# Patient Record
Sex: MALE | Race: BLACK OR AFRICAN AMERICAN | Employment: OTHER
[De-identification: names, ages, dates, MRNs, and addresses within clinical notes are randomized per-mention and may not be internally consistent; named-entity substitution may affect disease eponyms.]

---

## 2023-02-28 ENCOUNTER — HOSPITAL ENCOUNTER (EMERGENCY)
Facility: HOSPITAL | Age: 78
Discharge: HOME | End: 2023-02-28
Attending: EMERGENCY MEDICINE
Payer: MEDICARE

## 2023-02-28 VITALS
HEART RATE: 100 BPM | DIASTOLIC BLOOD PRESSURE: 93 MMHG | SYSTOLIC BLOOD PRESSURE: 147 MMHG | RESPIRATION RATE: 14 BRPM | OXYGEN SATURATION: 98 % | HEIGHT: 70 IN | WEIGHT: 183 LBS | TEMPERATURE: 98.4 F | BODY MASS INDEX: 26.2 KG/M2

## 2023-02-28 DIAGNOSIS — R41.0 CONFUSION: ICD-10-CM

## 2023-02-28 DIAGNOSIS — G20 PARKINSON DISEASE (HCC): Primary | ICD-10-CM

## 2023-02-28 LAB
ALBUMIN SERPL-MCNC: 4.8 G/DL (ref 3.4–5)
ALBUMIN/GLOB SERPL: 1.5 (ref 0.8–1.7)
ALP SERPL-CCNC: 87 U/L (ref 45–117)
ALT SERPL-CCNC: 27 U/L (ref 16–61)
ANION GAP SERPL CALC-SCNC: 4 MMOL/L (ref 3–18)
AST SERPL-CCNC: 17 U/L (ref 10–38)
BASOPHILS # BLD: 0 K/UL (ref 0–0.1)
BASOPHILS NFR BLD: 0 % (ref 0–2)
BILIRUB SERPL-MCNC: 1.8 MG/DL (ref 0.2–1)
BUN SERPL-MCNC: 31 MG/DL (ref 7–18)
BUN/CREAT SERPL: 18 (ref 12–20)
CALCIUM SERPL-MCNC: 9.8 MG/DL (ref 8.5–10.1)
CHLORIDE SERPL-SCNC: 101 MMOL/L (ref 100–111)
CO2 SERPL-SCNC: 30 MMOL/L (ref 21–32)
CREAT SERPL-MCNC: 1.76 MG/DL (ref 0.6–1.3)
DIFFERENTIAL METHOD BLD: ABNORMAL
EOSINOPHIL # BLD: 0 K/UL (ref 0–0.4)
EOSINOPHIL NFR BLD: 0 % (ref 0–5)
ERYTHROCYTE [DISTWIDTH] IN BLOOD BY AUTOMATED COUNT: 14.1 % (ref 11.6–14.5)
GLOBULIN SER CALC-MCNC: 3.3 G/DL (ref 2–4)
GLUCOSE SERPL-MCNC: 105 MG/DL (ref 74–99)
HCT VFR BLD AUTO: 52.9 % (ref 36–48)
HGB BLD-MCNC: 18.3 G/DL (ref 13–16)
IMM GRANULOCYTES # BLD AUTO: 0 K/UL (ref 0–0.04)
IMM GRANULOCYTES NFR BLD AUTO: 0 % (ref 0–0.5)
LYMPHOCYTES # BLD: 1 K/UL (ref 0.9–3.6)
LYMPHOCYTES NFR BLD: 11 % (ref 21–52)
MAGNESIUM SERPL-MCNC: 2.3 MG/DL (ref 1.6–2.6)
MCH RBC QN AUTO: 34.2 PG (ref 24–34)
MCHC RBC AUTO-ENTMCNC: 34.6 G/DL (ref 31–37)
MCV RBC AUTO: 98.9 FL (ref 78–100)
MONOCYTES # BLD: 0.9 K/UL (ref 0.05–1.2)
MONOCYTES NFR BLD: 10 % (ref 3–10)
NEUTS SEG # BLD: 6.8 K/UL (ref 1.8–8)
NEUTS SEG NFR BLD: 78 % (ref 40–73)
NRBC # BLD: 0 K/UL (ref 0–0.01)
NRBC BLD-RTO: 0 PER 100 WBC
PLATELET # BLD AUTO: 221 K/UL (ref 135–420)
PMV BLD AUTO: 10 FL (ref 9.2–11.8)
POTASSIUM SERPL-SCNC: 4.2 MMOL/L (ref 3.5–5.5)
PROT SERPL-MCNC: 8.1 G/DL (ref 6.4–8.2)
RBC # BLD AUTO: 5.35 M/UL (ref 4.35–5.65)
SODIUM SERPL-SCNC: 135 MMOL/L (ref 136–145)
WBC # BLD AUTO: 8.7 K/UL (ref 4.6–13.2)

## 2023-02-28 PROCEDURE — 80053 COMPREHEN METABOLIC PANEL: CPT

## 2023-02-28 PROCEDURE — 83735 ASSAY OF MAGNESIUM: CPT

## 2023-02-28 PROCEDURE — 99283 EMERGENCY DEPT VISIT LOW MDM: CPT

## 2023-02-28 PROCEDURE — 85025 COMPLETE CBC W/AUTO DIFF WBC: CPT

## 2023-02-28 ASSESSMENT — PAIN - FUNCTIONAL ASSESSMENT: PAIN_FUNCTIONAL_ASSESSMENT: NONE - DENIES PAIN

## 2023-02-28 NOTE — ED NOTES
Pt removed his SPO2 probe, BP cuff, and IV stating he doesn't need them anymore. Pt redirected to stay in bed until testing is done. Pt asks why the screens are taking pictures of him and the faces are watching him. Pt easily redirected @ this time. Sanam Trinh MD notified.      Vi Brown RN  02/28/23 8814

## 2023-02-28 NOTE — ED PROVIDER NOTES
EMERGENCY DEPARTMENT HISTORY AND PHYSICAL EXAM    Date: 2/28/2023  Patient Name: Simon Freitas    History of Presenting Illness     Chief Complaint   Patient presents with    Altered Mental Status         History Provided By: Patient, EMS, and medical record    Additional History (Context):   4:45 AM  Simon Freitas is a 77 y.o. male with PMHX of Parkinson's, senile dementia who presents to the emergency department C/O confusion altered mental status.  Patient has a history of Parkinson's and was driving to [place] to go shopping.  He appeared to have a confused appearance so paramedics and police were contacted.  They found his car across the street.  Paramedics noted a change and the patient states that the police are involved.  He repeatedly began conversing to me that \"police sometimes shoot the good dede.  \"    Social History  Denies smoking drinking or drugs    Family History  No known family medical history      PCP: JAISON SUMMERS MD    No current facility-administered medications for this encounter.     Current Outpatient Medications   Medication Sig Dispense Refill    carbidopa-levodopa (SINEMET)  MG per tablet Take 1 tablet by mouth every 4 hours         Past History     Past Medical History:  No past medical history on file.    Past Surgical History:  No past surgical history on file.    Family History:  No family history on file.    Social History:       Allergies:  No Known Allergies      Review of Systems   Review of Systems   Unable to perform ROS: Dementia     Physical Exam     Vitals:    02/28/23 0439   BP: (!) 147/93   Pulse: 100   Resp: 14   Temp: 98.4 °F (36.9 °C)   TempSrc: Oral   SpO2: 98%   Weight: 183 lb (83 kg)   Height: 5' 10\" (1.778 m)     Physical Exam  Vitals and nursing note reviewed.   Constitutional:       Appearance: Normal appearance.   HENT:      Head: Normocephalic and atraumatic.      Ears:      Comments: No blood or fluid from ears or nose     Nose:      Comments:  No blood or fluid from ears or nose     Mouth/Throat:      Mouth: Mucous membranes are moist.   Eyes:      Extraocular Movements: Extraocular movements intact. Cardiovascular:      Rate and Rhythm: Normal rate. Pulmonary:      Effort: Pulmonary effort is normal.   Abdominal:      Palpations: Abdomen is soft. Tenderness: There is no abdominal tenderness. Musculoskeletal:         General: No deformity. Cervical back: No rigidity. Skin:     General: Skin is warm and dry. Capillary Refill: Capillary refill takes less than 2 seconds. Neurological:      General: No focal deficit present. Mental Status: He is oriented to person, place, and time. GCS: GCS eye subscore is 4. GCS verbal subscore is 4. GCS motor subscore is 6. Cranial Nerves: No cranial nerve deficit or dysarthria. Comments: Patient has masked facies. He also walks with a shuffling gait.    Psychiatric:         Mood and Affect: Mood normal.         Behavior: Behavior normal.     Diagnostic Study Results     Labs -  Recent Results (from the past 24 hour(s))   Comprehensive Metabolic Panel    Collection Time: 02/28/23  4:40 AM   Result Value Ref Range    Sodium 135 (L) 136 - 145 mmol/L    Potassium 4.2 3.5 - 5.5 mmol/L    Chloride 101 100 - 111 mmol/L    CO2 30 21 - 32 mmol/L    Anion Gap 4 3.0 - 18 mmol/L    Glucose 105 (H) 74 - 99 mg/dL    BUN 31 (H) 7.0 - 18 MG/DL    Creatinine 1.76 (H) 0.6 - 1.3 MG/DL    Bun/Cre Ratio 18 12 - 20      Est, Glom Filt Rate 40 (L) >60 ml/min/1.73m2    Calcium 9.8 8.5 - 10.1 MG/DL    Total Bilirubin 1.8 (H) 0.2 - 1.0 MG/DL    ALT 27 16 - 61 U/L    AST 17 10 - 38 U/L    Alk Phosphatase 87 45 - 117 U/L    Total Protein 8.1 6.4 - 8.2 g/dL    Albumin 4.8 3.4 - 5.0 g/dL    Globulin 3.3 2.0 - 4.0 g/dL    Albumin/Globulin Ratio 1.5 0.8 - 1.7     Magnesium    Collection Time: 02/28/23  4:40 AM   Result Value Ref Range    Magnesium 2.3 1.6 - 2.6 mg/dL   CBC with Auto Differential    Collection Time: 02/28/23  4:40 AM   Result Value Ref Range    WBC 8.7 4.6 - 13.2 K/uL    RBC 5.35 4.35 - 5.65 M/uL    Hemoglobin 18.3 (H) 13.0 - 16.0 g/dL    Hematocrit 52.9 (H) 36.0 - 48.0 %    MCV 98.9 78.0 - 100.0 FL    MCH 34.2 (H) 24.0 - 34.0 PG    MCHC 34.6 31.0 - 37.0 g/dL    RDW 14.1 11.6 - 14.5 %    Platelets 978 212 - 902 K/uL    MPV 10.0 9.2 - 11.8 FL    Nucleated RBCs 0.0 0  WBC    nRBC 0.00 0.00 - 0.01 K/uL    Seg Neutrophils 78 (H) 40 - 73 %    Lymphocytes 11 (L) 21 - 52 %    Monocytes 10 3 - 10 %    Eosinophils % 0 0 - 5 %    Basophils 0 0 - 2 %    Immature Granulocytes 0 0.0 - 0.5 %    Segs Absolute 6.8 1.8 - 8.0 K/UL    Absolute Lymph # 1.0 0.9 - 3.6 K/UL    Absolute Mono # 0.9 0.05 - 1.2 K/UL    Absolute Eos # 0.0 0.0 - 0.4 K/UL    Basophils Absolute 0.0 0.0 - 0.1 K/UL    Absolute Immature Granulocyte 0.0 0.00 - 0.04 K/UL    Differential Type AUTOMATED          Radiologic Studies -   Patient politely quietly refused x-rays or CTs. No orders to display     [unfilled]  [unfilled]    Medications given in the ED-  Medications - No data to display      Medical Decision Making   I am the first provider for this patient. I reviewed the vital signs, available nursing notes, past medical history, past surgical history, family history and social history. Vital Signs-Reviewed the patient's vital signs. Pulse Oximetry Analysis - 98% on room air     Cardiac Monitor:  Rate: 88 bpm  Rhythm: sinus rhythm    EKG interpretation: (Preliminary)  Refused EKG     Records Reviewed: NURSING NOTES AND PREVIOUS MEDICAL RECORDS    Provider Notes (Medical Decision Making):   Patient arrived calm and cooperative with paramedics regarding confusion. After arrival he did communicate with us properly however was not interested in performing any radiologic studies including x-ray CT. His lungs are clear and he does not have a focal deficit on either confusion.   After we contacted his family we discovered this has been ongoing chronic and steadily worsening issue since his wife  dating back to December. He shows no evidence of acute focal deficit or seizure. Highly doubt this is meningitis. We will discharge patient to custody of his family allow him to follow-up with her regular doctor and/or neurologist.    Procedures:  Procedures    ED Course:   4:45 AM: Initial assessment performed. The patients presenting problems have been discussed, and they are in agreement with the care plan formulated and outlined with them. I have encouraged them to ask questions as they arise throughout their visit. Diagnosis and Disposition       DISCHARGE NOTE:  6:28 AM  Violeta Freitas's  results have been reviewed with him. He has been counseled regarding his diagnosis, treatment, and plan. He verbally conveys understanding and agreement of the signs, symptoms, diagnosis, treatment and prognosis and additionally agrees to follow up as discussed. He also agrees with the care-plan and conveys that all of his questions have been answered. I have also provided discharge instructions for him that include: educational information regarding their diagnosis and treatment, and list of reasons why they would want to return to the ED prior to their follow-up appointment, should his condition change. He has been provided with education for proper emergency department utilization. CLINICAL IMPRESSION:    1. Parkinson disease ([address]    2. Confusion        PLAN:  1. D/C Home  2. Medication List        ASK your doctor about these medications      carbidopa-levodopa  MG per tablet  Commonly known as: SINEMET            3. [unfilled]  _______________________________    This note was partially transcribed via voice recognition software. Although efforts have been made to catch any discrepancies, it may contain sound alike words, grammatical errors, or nonsensical words.             Hector Klein MD  [phone]

## 2023-02-28 NOTE — ED TRIAGE NOTES
EMS states pt was in [employer] for hours standing. Police were worried about pt and asked him to go to the hospital.  Pt came to ED d/t \"police sometimes shoot the good dede. \" Pt is A&O x 4. Speech is clear. NAD.  VSS

## 2023-02-28 NOTE — ED NOTES
This RN spoke to pt daughter. Pt daughter reports the police contacted her already and she is contacting her uncle to come since he lives closer. She reports pt has hx of Parkinson and has been having increasing confusion since his wife passed in august 2022. Daughter states that pt lives in [place] by himself. Pt currently being agitated and paranoid. stating the  [employer] are watching him.       Ana Maria Cuevas RN  02/28/23 2058